# Patient Record
(demographics unavailable — no encounter records)

---

## 2017-02-27 NOTE — RAD
RIGHT HIP TWO VIEWS:

 

Date:  2-27-17

 

FINDINGS: 

No fracture, dislocation, or acute bony change is seen.  The adjacent pubic ring appears intact.  Th
e hip joint is normal in width. 

 

IMPRESSION: 

No acute finding. 

 

POS: HOME